# Patient Record
Sex: MALE | Race: WHITE | NOT HISPANIC OR LATINO | Employment: OTHER | URBAN - METROPOLITAN AREA
[De-identification: names, ages, dates, MRNs, and addresses within clinical notes are randomized per-mention and may not be internally consistent; named-entity substitution may affect disease eponyms.]

---

## 2023-03-17 PROBLEM — I10 HYPERTENSION: Status: ACTIVE | Noted: 2023-03-17
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
## 2023-03-17 PROBLEM — I63.9 CEREBELLAR INFARCT (MULTI): Status: ACTIVE | Noted: 2023-03-17

## 2023-03-17 PROBLEM — S52.91XA CLOSED FRACTURE OF RIGHT RADIUS AND ULNA: Status: ACTIVE | Noted: 2023-03-17

## 2023-03-17 PROBLEM — L82.1 KERATOSIS, SEBORRHEIC: Status: ACTIVE | Noted: 2023-03-17

## 2023-03-17 PROBLEM — D50.9 ANEMIA, IRON DEFICIENCY: Status: ACTIVE | Noted: 2023-03-17

## 2023-03-17 PROBLEM — R19.7 DIARRHEA: Status: ACTIVE | Noted: 2023-03-17

## 2023-03-17 PROBLEM — K52.9 GASTROENTERITIS, ACUTE: Status: ACTIVE | Noted: 2023-03-17

## 2023-03-17 PROBLEM — K21.9 GERD (GASTROESOPHAGEAL REFLUX DISEASE): Status: ACTIVE | Noted: 2023-03-17

## 2023-03-17 PROBLEM — I95.1 CHRONIC ORTHOSTATIC HYPOTENSION: Status: ACTIVE | Noted: 2023-03-17

## 2023-03-17 PROBLEM — M16.0 OSTEOARTHRITIS OF BOTH HIPS: Status: ACTIVE | Noted: 2023-03-17

## 2023-03-17 PROBLEM — S51.811A LACERATION OF RIGHT FOREARM: Status: ACTIVE | Noted: 2023-03-17

## 2023-03-17 PROBLEM — M48.062 LUMBAR STENOSIS WITH NEUROGENIC CLAUDICATION: Status: ACTIVE | Noted: 2023-03-17

## 2023-03-17 PROBLEM — K59.00 CONSTIPATION: Status: ACTIVE | Noted: 2023-03-17

## 2023-03-17 PROBLEM — M96.1 FAILED BACK SYNDROME OF LUMBAR SPINE: Status: ACTIVE | Noted: 2023-03-17

## 2023-03-17 PROBLEM — C61 PROSTATE CANCER (MULTI): Status: ACTIVE | Noted: 2023-03-17

## 2023-03-17 PROBLEM — S39.92XA BACK INJURY, INITIAL ENCOUNTER: Status: ACTIVE | Noted: 2023-03-17

## 2023-03-17 PROBLEM — I63.449: Status: ACTIVE | Noted: 2023-03-17

## 2023-03-17 PROBLEM — I25.119 CORONARY ARTERY DISEASE WITH ANGINA PECTORIS (CMS-HCC): Status: ACTIVE | Noted: 2023-03-17

## 2023-03-17 PROBLEM — E11.9 DM2 (DIABETES MELLITUS, TYPE 2) (MULTI): Status: ACTIVE | Noted: 2023-03-17

## 2023-03-17 PROBLEM — R07.89 CHEST TIGHTNESS: Status: ACTIVE | Noted: 2023-03-17

## 2023-03-17 PROBLEM — S52.201A CLOSED FRACTURE OF RIGHT RADIUS AND ULNA: Status: ACTIVE | Noted: 2023-03-17

## 2023-03-17 PROBLEM — E78.5 BORDERLINE HYPERLIPIDEMIA: Status: ACTIVE | Noted: 2023-03-17

## 2023-03-17 PROBLEM — F41.9 ANXIETY: Status: ACTIVE | Noted: 2023-03-17

## 2023-03-17 PROBLEM — R42 VERTIGO: Status: ACTIVE | Noted: 2023-03-17

## 2023-03-17 PROBLEM — S61.451A DOG BITE OF RIGHT HAND: Status: ACTIVE | Noted: 2023-03-17

## 2023-03-17 PROBLEM — S69.91XA RIGHT WRIST INJURY, INITIAL ENCOUNTER: Status: ACTIVE | Noted: 2023-03-17

## 2023-03-17 RX ORDER — ATORVASTATIN CALCIUM 80 MG/1
1 TABLET, FILM COATED ORAL DAILY
COMMUNITY
Start: 2022-04-08 | End: 2023-05-05 | Stop reason: SDUPTHER

## 2023-03-17 RX ORDER — ESOMEPRAZOLE MAGNESIUM 40 MG/1
1 CAPSULE, DELAYED RELEASE ORAL DAILY
COMMUNITY
End: 2023-05-05 | Stop reason: WASHOUT

## 2023-03-17 RX ORDER — FLUDROCORTISONE ACETATE 0.1 MG/1
TABLET ORAL
COMMUNITY
Start: 2022-06-06

## 2023-03-17 RX ORDER — CITALOPRAM 40 MG/1
1 TABLET, FILM COATED ORAL DAILY
COMMUNITY
Start: 2015-12-10

## 2023-03-17 RX ORDER — GLUC/MSM/COLGN2/HYAL/ANTIARTH3 375-375-20
TABLET ORAL
COMMUNITY
End: 2023-05-05 | Stop reason: SDUPTHER

## 2023-03-17 RX ORDER — DOCUSATE SODIUM 250 MG
1 CAPSULE ORAL DAILY
COMMUNITY
End: 2023-05-05 | Stop reason: SDUPTHER

## 2023-03-17 RX ORDER — GABAPENTIN 300 MG/1
1 CAPSULE ORAL DAILY
COMMUNITY
Start: 2017-11-27 | End: 2023-06-05 | Stop reason: SDUPTHER

## 2023-03-17 RX ORDER — OXYBUTYNIN CHLORIDE 10 MG/1
1 TABLET, EXTENDED RELEASE ORAL DAILY
COMMUNITY
Start: 2019-03-06 | End: 2024-03-12 | Stop reason: WASHOUT

## 2023-03-17 RX ORDER — TAMSULOSIN HYDROCHLORIDE 0.4 MG/1
2 CAPSULE ORAL DAILY
COMMUNITY
Start: 2018-04-16

## 2023-03-17 RX ORDER — CLOPIDOGREL BISULFATE 75 MG/1
1 TABLET ORAL DAILY
COMMUNITY
Start: 2022-04-08 | End: 2024-03-12 | Stop reason: WASHOUT

## 2023-03-17 RX ORDER — CALCIUM CARBONATE/VITAMIN D3 600MG-5MCG
1 TABLET ORAL 2 TIMES DAILY
COMMUNITY

## 2023-03-17 RX ORDER — GLIMEPIRIDE 1 MG/1
1 TABLET ORAL 2 TIMES DAILY
COMMUNITY
Start: 2017-01-20 | End: 2024-03-12 | Stop reason: WASHOUT

## 2023-04-01 PROBLEM — E66.9 CLASS 1 OBESITY WITH BODY MASS INDEX (BMI) OF 31.0 TO 31.9 IN ADULT: Status: ACTIVE | Noted: 2023-04-01

## 2023-04-01 PROBLEM — E66.811 CLASS 1 OBESITY WITH BODY MASS INDEX (BMI) OF 31.0 TO 31.9 IN ADULT: Status: ACTIVE | Noted: 2023-04-01

## 2023-04-01 PROBLEM — E66.811 CLASS 1 OBESITY WITH BODY MASS INDEX (BMI) OF 30.0 TO 30.9 IN ADULT: Status: ACTIVE | Noted: 2023-04-01

## 2023-04-01 PROBLEM — E66.9 CLASS 1 OBESITY WITH BODY MASS INDEX (BMI) OF 30.0 TO 30.9 IN ADULT: Status: ACTIVE | Noted: 2023-04-01

## 2023-04-03 ENCOUNTER — APPOINTMENT (OUTPATIENT)
Dept: PRIMARY CARE | Facility: CLINIC | Age: 73
End: 2023-04-03
Payer: COMMERCIAL

## 2023-05-05 ENCOUNTER — LAB (OUTPATIENT)
Dept: LAB | Facility: LAB | Age: 73
End: 2023-05-05
Payer: MEDICARE

## 2023-05-05 ENCOUNTER — OFFICE VISIT (OUTPATIENT)
Dept: PRIMARY CARE | Facility: CLINIC | Age: 73
End: 2023-05-05
Payer: MEDICARE

## 2023-05-05 VITALS
BODY MASS INDEX: 26.8 KG/M2 | OXYGEN SATURATION: 98 % | HEIGHT: 74 IN | DIASTOLIC BLOOD PRESSURE: 72 MMHG | HEART RATE: 67 BPM | SYSTOLIC BLOOD PRESSURE: 120 MMHG | WEIGHT: 208.8 LBS | TEMPERATURE: 97.6 F

## 2023-05-05 DIAGNOSIS — I63.449 CEREBROVASCULAR ACCIDENT (CVA) DUE TO EMBOLISM OF CEREBELLAR ARTERY, UNSPECIFIED BLOOD VESSEL LATERALITY (MULTI): Primary | ICD-10-CM

## 2023-05-05 DIAGNOSIS — I25.119 CORONARY ARTERY DISEASE INVOLVING NATIVE CORONARY ARTERY OF NATIVE HEART WITH ANGINA PECTORIS (CMS-HCC): ICD-10-CM

## 2023-05-05 DIAGNOSIS — D50.9 IRON DEFICIENCY ANEMIA, UNSPECIFIED IRON DEFICIENCY ANEMIA TYPE: ICD-10-CM

## 2023-05-05 DIAGNOSIS — C61 PROSTATE CANCER (MULTI): ICD-10-CM

## 2023-05-05 DIAGNOSIS — Z00.00 MEDICARE ANNUAL WELLNESS VISIT, SUBSEQUENT: ICD-10-CM

## 2023-05-05 DIAGNOSIS — E11.69 TYPE 2 DIABETES MELLITUS WITH OTHER SPECIFIED COMPLICATION, WITHOUT LONG-TERM CURRENT USE OF INSULIN (MULTI): ICD-10-CM

## 2023-05-05 DIAGNOSIS — I10 HYPERTENSION, UNSPECIFIED TYPE: ICD-10-CM

## 2023-05-05 DIAGNOSIS — K21.00 GASTROESOPHAGEAL REFLUX DISEASE WITH ESOPHAGITIS WITHOUT HEMORRHAGE: ICD-10-CM

## 2023-05-05 LAB
ALANINE AMINOTRANSFERASE (SGPT) (U/L) IN SER/PLAS: 12 U/L (ref 10–52)
ALBUMIN (G/DL) IN SER/PLAS: 4.3 G/DL (ref 3.4–5)
ALKALINE PHOSPHATASE (U/L) IN SER/PLAS: 75 U/L (ref 33–136)
ANION GAP IN SER/PLAS: 13 MMOL/L (ref 10–20)
ASPARTATE AMINOTRANSFERASE (SGOT) (U/L) IN SER/PLAS: 19 U/L (ref 9–39)
BASOPHILS (10*3/UL) IN BLOOD BY AUTOMATED COUNT: 0.03 X10E9/L (ref 0–0.1)
BASOPHILS/100 LEUKOCYTES IN BLOOD BY AUTOMATED COUNT: 0.6 % (ref 0–2)
BILIRUBIN TOTAL (MG/DL) IN SER/PLAS: 0.7 MG/DL (ref 0–1.2)
CALCIUM (MG/DL) IN SER/PLAS: 9.5 MG/DL (ref 8.6–10.6)
CARBON DIOXIDE, TOTAL (MMOL/L) IN SER/PLAS: 27 MMOL/L (ref 21–32)
CHLORIDE (MMOL/L) IN SER/PLAS: 103 MMOL/L (ref 98–107)
CHOLESTEROL (MG/DL) IN SER/PLAS: 96 MG/DL (ref 0–199)
CHOLESTEROL IN HDL (MG/DL) IN SER/PLAS: 37.6 MG/DL
CHOLESTEROL/HDL RATIO: 2.6
CREATININE (MG/DL) IN SER/PLAS: 1.09 MG/DL (ref 0.5–1.3)
EOSINOPHILS (10*3/UL) IN BLOOD BY AUTOMATED COUNT: 0.3 X10E9/L (ref 0–0.4)
EOSINOPHILS/100 LEUKOCYTES IN BLOOD BY AUTOMATED COUNT: 6 % (ref 0–6)
ERYTHROCYTE DISTRIBUTION WIDTH (RATIO) BY AUTOMATED COUNT: 12.3 % (ref 11.5–14.5)
ERYTHROCYTE MEAN CORPUSCULAR HEMOGLOBIN CONCENTRATION (G/DL) BY AUTOMATED: 34 G/DL (ref 32–36)
ERYTHROCYTE MEAN CORPUSCULAR VOLUME (FL) BY AUTOMATED COUNT: 89 FL (ref 80–100)
ERYTHROCYTES (10*6/UL) IN BLOOD BY AUTOMATED COUNT: 3.6 X10E12/L (ref 4.5–5.9)
ESTIMATED AVERAGE GLUCOSE FOR HBA1C: 91 MG/DL
GFR MALE: 72 ML/MIN/1.73M2
GLUCOSE (MG/DL) IN SER/PLAS: 79 MG/DL (ref 74–99)
HEMATOCRIT (%) IN BLOOD BY AUTOMATED COUNT: 32.1 % (ref 41–52)
HEMOGLOBIN (G/DL) IN BLOOD: 10.9 G/DL (ref 13.5–17.5)
HEMOGLOBIN A1C/HEMOGLOBIN TOTAL IN BLOOD: 4.8 %
IMMATURE GRANULOCYTES/100 LEUKOCYTES IN BLOOD BY AUTOMATED COUNT: 0.4 % (ref 0–0.9)
IRON (UG/DL) IN SER/PLAS: 110 UG/DL (ref 35–150)
IRON BINDING CAPACITY (UG/DL) IN SER/PLAS: 274 UG/DL (ref 240–445)
IRON SATURATION (%) IN SER/PLAS: 40 % (ref 25–45)
LDL: 37 MG/DL (ref 0–99)
LEUKOCYTES (10*3/UL) IN BLOOD BY AUTOMATED COUNT: 5 X10E9/L (ref 4.4–11.3)
LYMPHOCYTES (10*3/UL) IN BLOOD BY AUTOMATED COUNT: 0.99 X10E9/L (ref 0.8–3)
LYMPHOCYTES/100 LEUKOCYTES IN BLOOD BY AUTOMATED COUNT: 19.7 % (ref 13–44)
MONOCYTES (10*3/UL) IN BLOOD BY AUTOMATED COUNT: 0.31 X10E9/L (ref 0.05–0.8)
MONOCYTES/100 LEUKOCYTES IN BLOOD BY AUTOMATED COUNT: 6.2 % (ref 2–10)
NEUTROPHILS (10*3/UL) IN BLOOD BY AUTOMATED COUNT: 3.37 X10E9/L (ref 1.6–5.5)
NEUTROPHILS/100 LEUKOCYTES IN BLOOD BY AUTOMATED COUNT: 67.1 % (ref 40–80)
NRBC (PER 100 WBCS) BY AUTOMATED COUNT: 0 /100 WBC (ref 0–0)
PLATELETS (10*3/UL) IN BLOOD AUTOMATED COUNT: 105 X10E9/L (ref 150–450)
POTASSIUM (MMOL/L) IN SER/PLAS: 4.4 MMOL/L (ref 3.5–5.3)
PROTEIN TOTAL: 6.1 G/DL (ref 6.4–8.2)
SODIUM (MMOL/L) IN SER/PLAS: 139 MMOL/L (ref 136–145)
TRIGLYCERIDE (MG/DL) IN SER/PLAS: 107 MG/DL (ref 0–149)
UREA NITROGEN (MG/DL) IN SER/PLAS: 25 MG/DL (ref 6–23)
VLDL: 21 MG/DL (ref 0–40)

## 2023-05-05 PROCEDURE — 83540 ASSAY OF IRON: CPT

## 2023-05-05 PROCEDURE — G0439 PPPS, SUBSEQ VISIT: HCPCS | Performed by: FAMILY MEDICINE

## 2023-05-05 PROCEDURE — 80061 LIPID PANEL: CPT

## 2023-05-05 PROCEDURE — 1160F RVW MEDS BY RX/DR IN RCRD: CPT | Performed by: FAMILY MEDICINE

## 2023-05-05 PROCEDURE — 83036 HEMOGLOBIN GLYCOSYLATED A1C: CPT

## 2023-05-05 PROCEDURE — 1159F MED LIST DOCD IN RCRD: CPT | Performed by: FAMILY MEDICINE

## 2023-05-05 PROCEDURE — 99214 OFFICE O/P EST MOD 30 MIN: CPT | Performed by: FAMILY MEDICINE

## 2023-05-05 PROCEDURE — 3008F BODY MASS INDEX DOCD: CPT | Performed by: FAMILY MEDICINE

## 2023-05-05 PROCEDURE — 3078F DIAST BP <80 MM HG: CPT | Performed by: FAMILY MEDICINE

## 2023-05-05 PROCEDURE — 85025 COMPLETE CBC W/AUTO DIFF WBC: CPT

## 2023-05-05 PROCEDURE — 99497 ADVNCD CARE PLAN 30 MIN: CPT | Performed by: FAMILY MEDICINE

## 2023-05-05 PROCEDURE — 3074F SYST BP LT 130 MM HG: CPT | Performed by: FAMILY MEDICINE

## 2023-05-05 PROCEDURE — 36415 COLL VENOUS BLD VENIPUNCTURE: CPT

## 2023-05-05 PROCEDURE — 80053 COMPREHEN METABOLIC PANEL: CPT

## 2023-05-05 PROCEDURE — 83550 IRON BINDING TEST: CPT

## 2023-05-05 PROCEDURE — 1036F TOBACCO NON-USER: CPT | Performed by: FAMILY MEDICINE

## 2023-05-05 RX ORDER — FERROUS SULFATE 325(65) MG
325 TABLET, DELAYED RELEASE (ENTERIC COATED) ORAL
COMMUNITY
Start: 2022-11-30

## 2023-05-05 RX ORDER — CALCIUM CARBONATE/VITAMIN D3 600MG-5MCG
TABLET ORAL EVERY 12 HOURS
COMMUNITY
End: 2023-05-05 | Stop reason: SDUPTHER

## 2023-05-05 RX ORDER — ATORVASTATIN CALCIUM 40 MG/1
40 TABLET, FILM COATED ORAL
COMMUNITY
Start: 2022-11-30

## 2023-05-05 RX ORDER — ACETAMINOPHEN 500 MG
TABLET ORAL
COMMUNITY
Start: 2022-11-30 | End: 2023-05-05 | Stop reason: SDUPTHER

## 2023-05-05 RX ORDER — DOCUSATE SODIUM 100 MG/1
100 CAPSULE, LIQUID FILLED ORAL
COMMUNITY
Start: 2022-04-08

## 2023-05-05 RX ORDER — LOSARTAN POTASSIUM AND HYDROCHLOROTHIAZIDE 12.5; 5 MG/1; MG/1
TABLET ORAL
COMMUNITY
Start: 2017-03-28 | End: 2023-05-05 | Stop reason: ALTCHOICE

## 2023-05-05 RX ORDER — OMEPRAZOLE 40 MG/1
CAPSULE, DELAYED RELEASE ORAL
COMMUNITY
Start: 2023-04-09

## 2023-05-05 RX ORDER — DIPHENHYDRAMINE HCL 25 MG
CAPSULE ORAL
COMMUNITY
Start: 2022-11-30 | End: 2023-05-05 | Stop reason: WASHOUT

## 2023-05-05 RX ORDER — CHOLECALCIFEROL (VITAMIN D3) 125 MCG
CAPSULE ORAL
COMMUNITY
End: 2023-05-05 | Stop reason: WASHOUT

## 2023-05-05 RX ORDER — PREDNISONE 50 MG/1
TABLET ORAL
COMMUNITY
Start: 2022-11-30 | End: 2023-05-05 | Stop reason: WASHOUT

## 2023-05-05 RX ORDER — QUINIDINE GLUCONATE 324 MG
TABLET, EXTENDED RELEASE ORAL
COMMUNITY
End: 2023-05-05 | Stop reason: WASHOUT

## 2023-05-05 SDOH — ECONOMIC STABILITY: FOOD INSECURITY: WITHIN THE PAST 12 MONTHS, THE FOOD YOU BOUGHT JUST DIDN'T LAST AND YOU DIDN'T HAVE MONEY TO GET MORE.: NEVER TRUE

## 2023-05-05 SDOH — ECONOMIC STABILITY: HOUSING INSECURITY
IN THE LAST 12 MONTHS, WAS THERE A TIME WHEN YOU DID NOT HAVE A STEADY PLACE TO SLEEP OR SLEPT IN A SHELTER (INCLUDING NOW)?: NO

## 2023-05-05 SDOH — ECONOMIC STABILITY: INCOME INSECURITY: IN THE LAST 12 MONTHS, WAS THERE A TIME WHEN YOU WERE NOT ABLE TO PAY THE MORTGAGE OR RENT ON TIME?: NO

## 2023-05-05 SDOH — ECONOMIC STABILITY: FOOD INSECURITY: WITHIN THE PAST 12 MONTHS, YOU WORRIED THAT YOUR FOOD WOULD RUN OUT BEFORE YOU GOT MONEY TO BUY MORE.: NEVER TRUE

## 2023-05-05 SDOH — ECONOMIC STABILITY: TRANSPORTATION INSECURITY
IN THE PAST 12 MONTHS, HAS THE LACK OF TRANSPORTATION KEPT YOU FROM MEDICAL APPOINTMENTS OR FROM GETTING MEDICATIONS?: NO

## 2023-05-05 SDOH — ECONOMIC STABILITY: TRANSPORTATION INSECURITY
IN THE PAST 12 MONTHS, HAS LACK OF TRANSPORTATION KEPT YOU FROM MEETINGS, WORK, OR FROM GETTING THINGS NEEDED FOR DAILY LIVING?: NO

## 2023-05-05 ASSESSMENT — LIFESTYLE VARIABLES
AUDIT-C TOTAL SCORE: 0
HOW OFTEN DO YOU HAVE SIX OR MORE DRINKS ON ONE OCCASION: NEVER
HOW MANY STANDARD DRINKS CONTAINING ALCOHOL DO YOU HAVE ON A TYPICAL DAY: PATIENT DOES NOT DRINK
SKIP TO QUESTIONS 9-10: 1
HOW OFTEN DO YOU HAVE A DRINK CONTAINING ALCOHOL: NEVER

## 2023-05-05 ASSESSMENT — PATIENT HEALTH QUESTIONNAIRE - PHQ9
SUM OF ALL RESPONSES TO PHQ9 QUESTIONS 1 & 2: 0
2. FEELING DOWN, DEPRESSED OR HOPELESS: NOT AT ALL
1. LITTLE INTEREST OR PLEASURE IN DOING THINGS: NOT AT ALL

## 2023-05-05 ASSESSMENT — SOCIAL DETERMINANTS OF HEALTH (SDOH)
WITHIN THE LAST YEAR, HAVE YOU BEEN HUMILIATED OR EMOTIONALLY ABUSED IN OTHER WAYS BY YOUR PARTNER OR EX-PARTNER?: NO
WITHIN THE LAST YEAR, HAVE YOU BEEN KICKED, HIT, SLAPPED, OR OTHERWISE PHYSICALLY HURT BY YOUR PARTNER OR EX-PARTNER?: NO
HOW HARD IS IT FOR YOU TO PAY FOR THE VERY BASICS LIKE FOOD, HOUSING, MEDICAL CARE, AND HEATING?: NOT HARD AT ALL
WITHIN THE LAST YEAR, HAVE TO BEEN RAPED OR FORCED TO HAVE ANY KIND OF SEXUAL ACTIVITY BY YOUR PARTNER OR EX-PARTNER?: NO
WITHIN THE LAST YEAR, HAVE YOU BEEN AFRAID OF YOUR PARTNER OR EX-PARTNER?: NO

## 2023-05-05 ASSESSMENT — ENCOUNTER SYMPTOMS
OCCASIONAL FEELINGS OF UNSTEADINESS: 0
EYES NEGATIVE: 1
FATIGUE: 1
ENDOCRINE COMMENTS: DIABETES MELLITUS
SHORTNESS OF BREATH: 1
CARDIOVASCULAR NEGATIVE: 1
DEPRESSION: 0
ARTHRALGIAS: 1
NEUROLOGICAL NEGATIVE: 1
LOSS OF SENSATION IN FEET: 0

## 2023-05-05 ASSESSMENT — PAIN SCALES - GENERAL: PAINLEVEL: 0-NO PAIN

## 2023-05-05 NOTE — PROGRESS NOTES
"Subjective   Patient ID: Talib Goodson is a 73 y.o. male who presents for Annual Exam (Assessment annual medicare wellness fasting bw).    HPI     Review of Systems   Constitutional:  Positive for fatigue.   HENT: Negative.     Eyes: Negative.    Respiratory:  Positive for shortness of breath.    Cardiovascular: Negative.         Dr Aden cardiac   Endocrine:        Diabetes Mellitus   Genitourinary:         Dr Dillard prostate cancer   Musculoskeletal:  Positive for arthralgias.   Skin: Negative.    Neurological: Negative.    Hematological:         Anemia   Psychiatric/Behavioral:          Depression       Objective   /72 (BP Location: Left arm)   Pulse 67   Temp 36.4 °C (97.6 °F) (Temporal)   Ht 1.88 m (6' 2\")   Wt 94.7 kg (208 lb 12.8 oz)   SpO2 98%   BMI 26.81 kg/m²     Physical Exam  Vitals and nursing note reviewed.   HENT:      Right Ear: Tympanic membrane normal.      Left Ear: Tympanic membrane normal.   Eyes:      Pupils: Pupils are equal, round, and reactive to light.   Cardiovascular:      Rate and Rhythm: Normal rate and regular rhythm.      Pulses: Normal pulses.      Heart sounds: Normal heart sounds.   Pulmonary:      Breath sounds: Normal breath sounds.   Abdominal:      Palpations: Abdomen is soft.   Skin:     Coloration: Skin is pale.   Neurological:      General: No focal deficit present.      Mental Status: He is alert and oriented to person, place, and time.   Psychiatric:         Mood and Affect: Mood normal.         Behavior: Behavior normal.         Thought Content: Thought content normal.         Judgment: Judgment normal.         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       Circulatory    Coronary artery disease with angina pectoris (CMS/HCC)    Relevant Orders    Lipid Panel    Hypertension       Digestive    GERD (gastroesophageal reflux disease)       Genitourinary    " Prostate cancer (CMS/[facility])       Endocrine/Metabolic    DM2 (diabetes mellitus, type 2) (CMS/[facility])    Relevant Orders    Comprehensive Metabolic Panel    Urinalysis Microscopic Only    Hemoglobin A1C    Albumin , Urine Random       Hematologic    Anemia, iron deficiency    Relevant Orders    CBC and Auto Differential    Iron and TIBC     Other Visit Diagnoses       Medicare annual wellness visit, subsequent        BMI 26.0-26.9,adult

## 2023-06-05 DIAGNOSIS — M48.062 LUMBAR STENOSIS WITH NEUROGENIC CLAUDICATION: Primary | ICD-10-CM

## 2023-06-05 RX ORDER — GABAPENTIN 300 MG/1
300 CAPSULE ORAL DAILY
Qty: 90 CAPSULE | Refills: 0 | Status: SHIPPED
Start: 2023-06-05 | End: 2024-03-12 | Stop reason: WASHOUT

## 2023-06-05 RX ORDER — GABAPENTIN 300 MG/1
300 CAPSULE ORAL DAILY
Qty: 90 CAPSULE | Refills: 1 | Status: SHIPPED | OUTPATIENT
Start: 2023-06-05

## 2023-06-26 ENCOUNTER — TELEPHONE (OUTPATIENT)
Dept: PRIMARY CARE | Facility: CLINIC | Age: 73
End: 2023-06-26
Payer: COMMERCIAL

## 2023-06-26 NOTE — TELEPHONE ENCOUNTER
Called and notified pt's spouse. They reached out to Dr. Aden to try and get an ICA stenosis monitor done. And will potentially need a referral for a  neurologist

## 2023-10-31 ENCOUNTER — LAB (OUTPATIENT)
Dept: LAB | Facility: LAB | Age: 73
End: 2023-10-31
Payer: MEDICARE

## 2023-10-31 DIAGNOSIS — C61 MALIGNANT NEOPLASM OF PROSTATE (MULTI): Primary | ICD-10-CM

## 2023-10-31 LAB
ALBUMIN SERPL BCP-MCNC: 4.1 G/DL (ref 3.4–5)
ALP SERPL-CCNC: 76 U/L (ref 33–136)
ALT SERPL W P-5'-P-CCNC: 12 U/L (ref 10–52)
ANION GAP SERPL CALC-SCNC: 11 MMOL/L (ref 10–20)
AST SERPL W P-5'-P-CCNC: 16 U/L (ref 9–39)
BILIRUB SERPL-MCNC: 0.6 MG/DL (ref 0–1.2)
BUN SERPL-MCNC: 23 MG/DL (ref 6–23)
CALCIUM SERPL-MCNC: 9 MG/DL (ref 8.6–10.3)
CHLORIDE SERPL-SCNC: 102 MMOL/L (ref 98–107)
CO2 SERPL-SCNC: 27 MMOL/L (ref 21–32)
CREAT SERPL-MCNC: 1.04 MG/DL (ref 0.5–1.3)
GFR SERPL CREATININE-BSD FRML MDRD: 76 ML/MIN/1.73M*2
GLUCOSE SERPL-MCNC: 78 MG/DL (ref 74–99)
POTASSIUM SERPL-SCNC: 4.2 MMOL/L (ref 3.5–5.3)
PROT SERPL-MCNC: 5.9 G/DL (ref 6.4–8.2)
SODIUM SERPL-SCNC: 136 MMOL/L (ref 136–145)

## 2023-10-31 PROCEDURE — 36415 COLL VENOUS BLD VENIPUNCTURE: CPT

## 2023-10-31 PROCEDURE — 80053 COMPREHEN METABOLIC PANEL: CPT

## 2023-11-02 ENCOUNTER — HOSPITAL ENCOUNTER (OUTPATIENT)
Dept: RADIOLOGY | Facility: CLINIC | Age: 73
Discharge: HOME | End: 2023-11-02
Payer: MEDICARE

## 2023-11-02 DIAGNOSIS — C61 MALIGNANT NEOPLASM OF PROSTATE (MULTI): ICD-10-CM

## 2023-11-02 DIAGNOSIS — R97.21 RISING PSA FOLLOWING TREATMENT FOR MALIGNANT NEOPLASM OF PROSTATE: ICD-10-CM

## 2023-11-02 PROCEDURE — 3430000001 HC RX 343 DIAGNOSTIC RADIOPHARMACEUTICALS: Performed by: UROLOGY

## 2023-11-02 PROCEDURE — A9800 HC RX 343 DIAGNOSTIC RADIOPHARMACEUTICALS: HCPCS | Performed by: UROLOGY

## 2023-11-02 PROCEDURE — 78815 PET IMAGE W/CT SKULL-THIGH: CPT | Mod: PS

## 2023-11-02 PROCEDURE — 78815 PET IMAGE W/CT SKULL-THIGH: CPT | Mod: PET TUMOR SUBSQ TX STRATEGY | Performed by: NUCLEAR MEDICINE

## 2023-11-02 RX ORDER — FLUDEOXYGLUCOSE F 18 200 MCI/ML
5.39 INJECTION, SOLUTION INTRAVENOUS
Status: DISCONTINUED | OUTPATIENT
Start: 2023-11-02 | End: 2023-11-03 | Stop reason: HOSPADM

## 2023-11-02 RX ADMIN — KIT FOR THE PREPARATION OF GALLIUM GA 68 GOZETOTIDE INJECTION 5.39 MILLICURIE: KIT INTRAVENOUS at 12:59

## 2024-03-07 PROBLEM — S61.451A DOG BITE OF RIGHT HAND: Status: RESOLVED | Noted: 2023-03-17 | Resolved: 2024-03-07

## 2024-03-07 PROBLEM — R41.841 COGNITIVE COMMUNICATION DEFICIT: Status: ACTIVE | Noted: 2023-07-05

## 2024-03-07 PROBLEM — R94.31 ELECTROCARDIOGRAM ABNORMAL: Status: ACTIVE | Noted: 2023-07-05

## 2024-03-07 PROBLEM — C79.51 MALIGNANT NEOPLASM METASTATIC TO BONE (MULTI): Status: ACTIVE | Noted: 2023-02-01
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
## 2024-03-07 PROBLEM — E03.9 ACQUIRED HYPOTHYROIDISM: Status: ACTIVE | Noted: 2023-10-11

## 2024-03-12 ENCOUNTER — OFFICE VISIT (OUTPATIENT)
Dept: CARDIOLOGY | Facility: CLINIC | Age: 74
End: 2024-03-12
Payer: COMMERCIAL

## 2024-03-12 ENCOUNTER — HOSPITAL ENCOUNTER (OUTPATIENT)
Dept: CARDIOLOGY | Facility: CLINIC | Age: 74
Discharge: HOME | End: 2024-03-12
Payer: COMMERCIAL

## 2024-03-12 VITALS
OXYGEN SATURATION: 96 % | HEART RATE: 84 BPM | DIASTOLIC BLOOD PRESSURE: 70 MMHG | BODY MASS INDEX: 25.94 KG/M2 | WEIGHT: 202 LBS | SYSTOLIC BLOOD PRESSURE: 100 MMHG

## 2024-03-12 VITALS
HEIGHT: 74 IN | DIASTOLIC BLOOD PRESSURE: 88 MMHG | SYSTOLIC BLOOD PRESSURE: 142 MMHG | WEIGHT: 205 LBS | BODY MASS INDEX: 26.31 KG/M2

## 2024-03-12 DIAGNOSIS — I25.118 ATHEROSCLEROTIC HEART DISEASE OF NATIVE CORONARY ARTERY WITH OTHER FORMS OF ANGINA PECTORIS (CMS-HCC): ICD-10-CM

## 2024-03-12 DIAGNOSIS — R07.9 CHEST PAIN, UNSPECIFIED: ICD-10-CM

## 2024-03-12 DIAGNOSIS — I25.110 ATHEROSCLEROTIC HEART DISEASE OF NATIVE CORONARY ARTERY WITH UNSTABLE ANGINA PECTORIS (MULTI): ICD-10-CM

## 2024-03-12 DIAGNOSIS — I95.1 ORTHOSTATIC HYPOTENSION: ICD-10-CM

## 2024-03-12 DIAGNOSIS — I95.1 CHRONIC ORTHOSTATIC HYPOTENSION: ICD-10-CM

## 2024-03-12 DIAGNOSIS — I10 PRIMARY HYPERTENSION: Primary | ICD-10-CM

## 2024-03-12 DIAGNOSIS — R94.31 ELECTROCARDIOGRAM ABNORMAL: ICD-10-CM

## 2024-03-12 DIAGNOSIS — R07.89 CHEST TIGHTNESS: ICD-10-CM

## 2024-03-12 DIAGNOSIS — E78.5 HYPERLIPIDEMIA, UNSPECIFIED HYPERLIPIDEMIA TYPE: ICD-10-CM

## 2024-03-12 DIAGNOSIS — I25.119 CORONARY ARTERY DISEASE INVOLVING NATIVE CORONARY ARTERY OF NATIVE HEART WITH ANGINA PECTORIS (CMS-HCC): ICD-10-CM

## 2024-03-12 PROCEDURE — 99214 OFFICE O/P EST MOD 30 MIN: CPT | Performed by: INTERNAL MEDICINE

## 2024-03-12 PROCEDURE — 1036F TOBACCO NON-USER: CPT | Performed by: INTERNAL MEDICINE

## 2024-03-12 PROCEDURE — 3008F BODY MASS INDEX DOCD: CPT | Performed by: INTERNAL MEDICINE

## 2024-03-12 PROCEDURE — 3074F SYST BP LT 130 MM HG: CPT | Performed by: INTERNAL MEDICINE

## 2024-03-12 PROCEDURE — 3078F DIAST BP <80 MM HG: CPT | Performed by: INTERNAL MEDICINE

## 2024-03-12 PROCEDURE — 93306 TTE W/DOPPLER COMPLETE: CPT | Performed by: INTERNAL MEDICINE

## 2024-03-12 PROCEDURE — 1126F AMNT PAIN NOTED NONE PRSNT: CPT | Performed by: INTERNAL MEDICINE

## 2024-03-12 PROCEDURE — 1159F MED LIST DOCD IN RCRD: CPT | Performed by: INTERNAL MEDICINE

## 2024-03-12 PROCEDURE — 93306 TTE W/DOPPLER COMPLETE: CPT

## 2024-03-12 RX ORDER — DIVALPROEX SODIUM 125 MG/1
125 CAPSULE, COATED PELLETS ORAL
COMMUNITY
Start: 2024-02-02 | End: 2025-02-01

## 2024-03-12 RX ORDER — UBIDECARENONE 75 MG
CAPSULE ORAL
COMMUNITY
Start: 2023-07-10

## 2024-03-12 RX ORDER — PANTOPRAZOLE SODIUM 40 MG/1
40 TABLET, DELAYED RELEASE ORAL
COMMUNITY

## 2024-03-12 RX ORDER — NAPROXEN SODIUM 220 MG/1
81 TABLET, FILM COATED ORAL
COMMUNITY
Start: 2023-11-20

## 2024-03-12 RX ORDER — NEOMYCIN/BACITRACIN/POLYMYXINB 3.5-400-5K
1 OINTMENT (GRAM) TOPICAL NIGHTLY
COMMUNITY
Start: 2024-02-02

## 2024-03-12 RX ORDER — LEVOTHYROXINE SODIUM 100 UG/1
100 TABLET ORAL
COMMUNITY
Start: 2024-01-02

## 2024-03-12 ASSESSMENT — ENCOUNTER SYMPTOMS
FREQUENCY: 1
DYSPNEA ON EXERTION: 1

## 2024-03-12 NOTE — PROGRESS NOTES
Subjective   Talib Goodson is a 74 y.o. male.    Chief Complaint:  Follow-up orthostatic hypotension.  Follow-up coronary disease.  Hospital follow-up.    HPI    He was hospitalized in February when he presented to the emergency room with an altered mental status and hypoglycemia.  He had poor oral intake because of frequent urination at night.  He presented with a glucose of 53 and acute renal insufficiency.  Oxybutynin was stopped because of concerns for aggravation of his orthostatic hypotension.  From a cardiac standpoint he has been doing well.  Denies palpitations or tachycardia.  Denies chest discomfort.  No shortness of breath.  Wife reports loud snoring but he reports no other symptoms suggestive of sleep apnea.  Gets dizzy and lightheaded when he stands up quickly.  No syncopal or near syncopal events.    In June 2022 t he presented to us with symptoms of dizziness and lightheadedness particularly when he stood up and walked around. He felt he had to hold onto things just ambulate. We noted that he was significantly orthostatic and started the combination of fludrocortisone and high salt diet.      The patient presented in March, 2022 with an acute stroke. This was felt to be embolic. He was given tPA. He had virtually a full recovery. He presented with slurred speech and right-sided weakness.      He has a history of mild coronary artery disease based on a positive CT calcium score performed in March 2022. He has a total calcium score 27 consistent with the presence of mild atherosclerotic coronary artery disease.     He denies a history of hypertension. Denies any palpitations. No history of atrial fibrillation.     He had an echocardiographic study at [facility] which was essentially normal demonstrating normal left ventricular function.     Past medical history: Significant for diabetes which is non-insulin-dependent. He said degenerative arthritis and lumbar spine issues. Has  degenerative joint disease.     Past surgical history: No significant surgical procedures.     Allergies  NonMedication    · IV Contrast Dye   Anaphylaxis;; Updated By: Marianna Edwards; 1/20/2017 12:17:59 PM     Family History  Mother    · Family history of cerebral hemorrhage (V17.2) (Z82.3)   · Family history of diabetes mellitus (V18.0) (Z83.3)   · Family history of stroke (V17.1) (Z82.3)  Sister    · Family history of diabetes mellitus (V18.0) (Z83.3)     Social History  Problems    · Former smoker (V15.82) (Z87.891)    Review of Systems   Constitutional: Positive for malaise/fatigue.   Cardiovascular:  Positive for dyspnea on exertion.   Musculoskeletal:  Positive for arthritis.   Genitourinary:  Positive for frequency.   All other systems reviewed and are negative.      Visit Vitals  /70 (BP Location: Right arm, Patient Position: Sitting)   Pulse 84   Wt 91.6 kg (202 lb)   SpO2 96%   BMI 25.94 kg/m²   Smoking Status Never   BSA 2.19 m²        Objective     Constitutional:       Appearance: Not in distress.   Neck:      Vascular: JVD normal.   Pulmonary:      Breath sounds: Normal breath sounds.   Cardiovascular:      Normal rate. Regular rhythm. S1 with normal intensity. S2 with normal intensity.       Murmurs: There is a grade 2/6 systolic murmur.      No gallop.    Pulses:     Intact distal pulses.   Edema:     Peripheral edema absent.   Abdominal:      General: Bowel sounds are normal.   Neurological:      Mental Status: Alert and oriented to person, place and time.         Lab Review:   Lab Results   Component Value Date     10/31/2023    K 4.2 10/31/2023     10/31/2023    CO2 27 10/31/2023    BUN 23 10/31/2023    CREATININE 1.04 10/31/2023    GLUCOSE 78 10/31/2023    CALCIUM 9.0 10/31/2023     Lab Results   Component Value Date    CHOL 96 05/05/2023    TRIG 107 05/05/2023    HDL 37.6 (A) 05/05/2023       Assessment:    1.  Orthostatic hypotension.  Mildly orthostatic today.  Systolic  blood pressure is 100 at baseline.  His symptoms remain unchanged.  Should he get worse we will switch his fludrocortisone to midodrine.    2.  Coronary disease.  Mild by prior evaluations.    3.  History of hypothyroidism.  Latest TSH is 3.7.    4.  Hyperlipidemia.  Most recent total cholesterol is 96 with an LDL of 37.    5.  Anemia.  Hemoglobin of 10 hematocrit 27%.    6.  History of embolic CVA.  No significant carotid disease.

## 2024-03-13 LAB
AORTIC VALVE MEAN GRADIENT: 3.9 MMHG
AORTIC VALVE PEAK VELOCITY: 1.4 M/S
AV PEAK GRADIENT: 7.9 MMHG
AVA (PEAK VEL): 2.36 CM2
AVA (VTI): 2.63 CM2
EJECTION FRACTION APICAL 4 CHAMBER: 64.5
EJECTION FRACTION: 52 %
LEFT ATRIUM VOLUME AREA LENGTH INDEX BSA: 18.2 ML/M2
LEFT VENTRICLE INTERNAL DIMENSION DIASTOLE: 4.51 CM (ref 3.5–6)
LEFT VENTRICULAR OUTFLOW TRACT DIAMETER: 2.3 CM
MITRAL VALVE E/A RATIO: 0.67
RIGHT VENTRICLE FREE WALL PEAK S': 1.8 CM/S
RIGHT VENTRICLE PEAK SYSTOLIC PRESSURE: 22.2 MMHG
TRICUSPID ANNULAR PLANE SYSTOLIC EXCURSION: 3 CM

## 2024-07-15 DIAGNOSIS — E78.5 HYPERLIPIDEMIA, UNSPECIFIED HYPERLIPIDEMIA TYPE: ICD-10-CM

## 2024-07-23 RX ORDER — FLUDROCORTISONE ACETATE 0.1 MG/1
0.1 TABLET ORAL DAILY
Qty: 90 TABLET | Refills: 3 | Status: SHIPPED | OUTPATIENT
Start: 2024-07-23

## 2024-09-11 ENCOUNTER — APPOINTMENT (OUTPATIENT)
Dept: CARDIOLOGY | Facility: CLINIC | Age: 74
End: 2024-09-11
Payer: COMMERCIAL

## 2025-07-07 ENCOUNTER — HOSPITAL ENCOUNTER (OUTPATIENT)
Dept: RADIOLOGY | Facility: CLINIC | Age: 75
Discharge: HOME | End: 2025-07-07
Payer: COMMERCIAL

## 2025-07-07 DIAGNOSIS — C61 MALIGNANT NEOPLASM OF PROSTATE (MULTI): ICD-10-CM

## 2025-07-07 PROCEDURE — 78815 PET IMAGE W/CT SKULL-THIGH: CPT | Mod: PET TUMOR SUBSQ TX STRATEGY | Performed by: INTERNAL MEDICINE

## 2025-07-07 PROCEDURE — A9596 HC RX 343 DIAGNOSTIC RADIOPHARMACEUTICALS: HCPCS | Performed by: STUDENT IN AN ORGANIZED HEALTH CARE EDUCATION/TRAINING PROGRAM

## 2025-07-07 PROCEDURE — 3430000001 HC RX 343 DIAGNOSTIC RADIOPHARMACEUTICALS: Performed by: STUDENT IN AN ORGANIZED HEALTH CARE EDUCATION/TRAINING PROGRAM

## 2025-07-07 PROCEDURE — 78815 PET IMAGE W/CT SKULL-THIGH: CPT | Mod: PS

## 2025-07-07 RX ADMIN — KIT FOR THE PREPARATION OF GALLIUM GA 68 GOZETOTIDE INJECTION 5.18 MILLICURIE: KIT INTRAVENOUS at 09:55

## 2025-08-18 ENCOUNTER — APPOINTMENT (OUTPATIENT)
Dept: PRIMARY CARE | Facility: CLINIC | Age: 75
End: 2025-08-18
Payer: MEDICARE